# Patient Record
Sex: MALE | Race: OTHER | URBAN - METROPOLITAN AREA
[De-identification: names, ages, dates, MRNs, and addresses within clinical notes are randomized per-mention and may not be internally consistent; named-entity substitution may affect disease eponyms.]

---

## 2024-06-13 ENCOUNTER — HOSPITAL ENCOUNTER (OUTPATIENT)
Facility: HOSPITAL | Age: 69
Discharge: HOME | End: 2024-06-13
Attending: INTERNAL MEDICINE | Admitting: INTERNAL MEDICINE
Payer: MEDICARE

## 2024-06-13 VITALS
HEART RATE: 63 BPM | DIASTOLIC BLOOD PRESSURE: 76 MMHG | TEMPERATURE: 97.4 F | SYSTOLIC BLOOD PRESSURE: 138 MMHG | HEIGHT: 69 IN | RESPIRATION RATE: 14 BRPM | OXYGEN SATURATION: 96 % | WEIGHT: 170 LBS | BODY MASS INDEX: 25.18 KG/M2

## 2024-06-13 DIAGNOSIS — R07.9 CHEST PAIN, UNSPECIFIED TYPE: ICD-10-CM

## 2024-06-13 LAB
ECHO BSA: 1.94 M2
EKG ATRIAL RATE: 63 BPM
EKG DIAGNOSIS: NORMAL
EKG P AXIS: 80 DEGREES
EKG P-R INTERVAL: 166 MS
EKG Q-T INTERVAL: 432 MS
EKG QRS DURATION: 90 MS
EKG QTC CALCULATION (BAZETT): 442 MS
EKG R AXIS: 55 DEGREES
EKG T AXIS: 41 DEGREES

## 2024-06-13 PROCEDURE — 99153 MOD SED SAME PHYS/QHP EA: CPT | Performed by: INTERNAL MEDICINE

## 2024-06-13 PROCEDURE — 6360000002 HC RX W HCPCS: Performed by: INTERNAL MEDICINE

## 2024-06-13 PROCEDURE — 93458 L HRT ARTERY/VENTRICLE ANGIO: CPT | Performed by: INTERNAL MEDICINE

## 2024-06-13 PROCEDURE — 99152 MOD SED SAME PHYS/QHP 5/>YRS: CPT | Performed by: INTERNAL MEDICINE

## 2024-06-13 PROCEDURE — C1894 INTRO/SHEATH, NON-LASER: HCPCS | Performed by: INTERNAL MEDICINE

## 2024-06-13 PROCEDURE — 2580000003 HC RX 258: Performed by: INTERNAL MEDICINE

## 2024-06-13 PROCEDURE — 6360000004 HC RX CONTRAST MEDICATION: Performed by: INTERNAL MEDICINE

## 2024-06-13 PROCEDURE — C1713 ANCHOR/SCREW BN/BN,TIS/BN: HCPCS | Performed by: INTERNAL MEDICINE

## 2024-06-13 PROCEDURE — 93005 ELECTROCARDIOGRAM TRACING: CPT | Performed by: INTERNAL MEDICINE

## 2024-06-13 PROCEDURE — 2709999900 HC NON-CHARGEABLE SUPPLY: Performed by: INTERNAL MEDICINE

## 2024-06-13 PROCEDURE — 93571 IV DOP VEL&/PRESS C FLO 1ST: CPT | Performed by: INTERNAL MEDICINE

## 2024-06-13 PROCEDURE — 2500000003 HC RX 250 WO HCPCS: Performed by: INTERNAL MEDICINE

## 2024-06-13 PROCEDURE — C1769 GUIDE WIRE: HCPCS | Performed by: INTERNAL MEDICINE

## 2024-06-13 PROCEDURE — C1887 CATHETER, GUIDING: HCPCS | Performed by: INTERNAL MEDICINE

## 2024-06-13 RX ORDER — NITROGLYCERIN 0.3 MG/1
0.3 TABLET SUBLINGUAL EVERY 5 MIN PRN
COMMUNITY

## 2024-06-13 RX ORDER — HEPARIN SODIUM 200 [USP'U]/100ML
INJECTION, SOLUTION INTRAVENOUS CONTINUOUS PRN
Status: COMPLETED | OUTPATIENT
Start: 2024-06-13 | End: 2024-06-13

## 2024-06-13 RX ORDER — FENTANYL CITRATE 50 UG/ML
INJECTION, SOLUTION INTRAMUSCULAR; INTRAVENOUS PRN
Status: DISCONTINUED | OUTPATIENT
Start: 2024-06-13 | End: 2024-06-13 | Stop reason: HOSPADM

## 2024-06-13 RX ORDER — MIDAZOLAM HYDROCHLORIDE 1 MG/ML
INJECTION INTRAMUSCULAR; INTRAVENOUS PRN
Status: DISCONTINUED | OUTPATIENT
Start: 2024-06-13 | End: 2024-06-13 | Stop reason: HOSPADM

## 2024-06-13 RX ORDER — BIVALIRUDIN 250 MG/5ML
INJECTION, POWDER, LYOPHILIZED, FOR SOLUTION INTRAVENOUS PRN
Status: DISCONTINUED | OUTPATIENT
Start: 2024-06-13 | End: 2024-06-13 | Stop reason: HOSPADM

## 2024-06-13 RX ORDER — VERAPAMIL HYDROCHLORIDE 2.5 MG/ML
INJECTION, SOLUTION INTRAVENOUS PRN
Status: DISCONTINUED | OUTPATIENT
Start: 2024-06-13 | End: 2024-06-13 | Stop reason: HOSPADM

## 2024-06-13 RX ORDER — HEPARIN SODIUM 1000 [USP'U]/ML
INJECTION, SOLUTION INTRAVENOUS; SUBCUTANEOUS PRN
Status: DISCONTINUED | OUTPATIENT
Start: 2024-06-13 | End: 2024-06-13 | Stop reason: HOSPADM

## 2024-06-13 RX ORDER — LIDOCAINE HYDROCHLORIDE 10 MG/ML
INJECTION, SOLUTION INFILTRATION; PERINEURAL PRN
Status: DISCONTINUED | OUTPATIENT
Start: 2024-06-13 | End: 2024-06-13 | Stop reason: HOSPADM

## 2024-06-13 RX ORDER — SODIUM CHLORIDE 9 MG/ML
INJECTION, SOLUTION INTRAVENOUS CONTINUOUS PRN
Status: COMPLETED | OUTPATIENT
Start: 2024-06-13 | End: 2024-06-13

## 2024-06-13 NOTE — PROGRESS NOTES
1525  Pt arrived to recovery room post procedure.  EKG retrieved per MD request  Arm immobilizer placed on affected wrist.    1530  Pt eating and tolerating PO diet well.    TR BANDS:  1715  Began removal of air from TR BAND. No bleeding and no hematoma present at sites.    1745  Educated patient about their sedation precautions such as not driving, operating any machinery, or signing legal documents 24 hours post procedure.  Reviewed discharge instructions, including medications and site care using the teach back method. Answered all questions. Verbalized understanding. Pt had an opportunity to ask questions. Pt is also aware of how to handle a site if it starts bleeding or develops a hematoma.    1800  Removal of air from TR Band complete. No bleeding and no hematoma.    1815  TR Band removed. No bleeding and no hematoma present. Tegaderm and gauze dressing applied.     1820  Pt walked to the restroom and voided successfully. Site(s) have no bleeding and no hematoma present    1830  RN removed pt IV.  Pt getting dressed  RN called pt ride home.    1838  Arm immobilizer placed on affected wrist.  Pt discharged to ride at main entrance of hospital via wheel chair by RN.  Pt discharged withDischarge Paperwork, Extra Band Aids, Clothing, Dentures, Wrist Immobilizer, \"Who Cared For You\" Card, and Glasses

## 2024-06-13 NOTE — PROGRESS NOTES
Cardiac Cath Lab Recovery Arrival Note:      Kan Rangel arrived to Cardiac Cath Lab, Recovery Area. Staff introduced to patient. Patient identifiers verified with NAME and DATE OF BIRTH. Procedure verified with patient. Consent forms reviewed and signed by patient or authorized representative and verified. Allergies verified.     Patient and family oriented to department. Patient and family informed of procedure and plan of care.     Questions answered with review. Patient prepped for procedure, per orders from physician, prior to arrival.    Patient on cardiac monitor, non-invasive blood pressure, SPO2 monitor. On room air. Patient is A&Ox 4. Patient reports no pain.     Patient in stretcher, in low position, with side rails up, call bell within reach, patient instructed to call if assistance as needed.    Patient prep in: [facility] Recovery Area, [place] FAST TRACK 4.   Patient family has pager # [phone]  Family in: [phone].   Prep by: YESSI

## 2024-06-13 NOTE — PROGRESS NOTES
1525  [facility] Procedural to Recovery REPORT:    Verbal report received from GURWINDER Jacobo on Kan Rangel  being received from [facility] Procedural Area for routine progression of care. Report consisted of patient’s Situation, Background, Assessment and Recommendations(SBAR). Information from the following report(s) Procedure, Findings, Medications, and Site Assessment; was reviewed with the receiving clinician. Opportunity for questions and clarification was provided. Assessment completed upon patient’s arrival to Cardiac Cath Lab RECOVERY AREA and care assumed.       Cardiac Cath Lab Recovery Arrival Note:    Kan Rangel arrived to [facility] recovery area.  Patient procedure= LHC. Patient on cardiac monitor, non-invasive blood pressure, SPO2 monitor. On room air.  IV  of NS on pump at 100 ml/hr. Patient status doing well without problems. Patient is A&Ox 4. Patient reports no complaints.    PROCEDURE SITE CHECK:    Procedure site:without any bleeding and no hematoma, no pain/discomfort reported at procedure site.     No change in patient status. Continue to monitor patient and status.

## 2024-06-13 NOTE — DISCHARGE INSTRUCTIONS
any signs or symptoms of infection such as fever, chills, or poorly healing incision, persistent tenderness or swelling around the access site, redness and/or warmth to the touch, numbness, significant tingling or pain at the access site or affected extremity, rash, drainage from the access site, or if the affected arm or leg feels tight or swollen, call your physician right away.    30 minutes of activity a day is recommended. If it is too hot or too cold outside, perform activities in doors.     Limit bending at the site. Do not strain when baring down with bowel movements. These activities could cause the site to bleed.     If you have a groin site: limit how often you go up and down stairs. Take the stairs slowly.    If bleeding or hematoma occurs at the access site occurs, take a clean gauze pad and apply direct pressure to the cath site just above the puncture site.  Call 911 immediately, and continue to apply direct pressure until an ambulance gets to your location to take you to the ER. DO not drive yourself, do not have anyone else drive you.     CALL 911 for any circumstance that you feel is emergent.    If you take METFORMIN and received contrast dye today; do not take this medication for 48 hours.     You may return to work  2  days after your procedure if no access site bleeding.       Information obtained by :  I understand that if any problems occur once I am at home I am to contact my physician.    I understand and acknowledge receipt of the instructions indicated above.                                                                                                                                           R.N.'s Signature                                                                  Date/Time                                                                                                                                              Patient or Representative Signature

## 2024-06-13 NOTE — PROGRESS NOTES
Cardiac Cath Lab Procedure Area Arrival Note:    Kan Rangel arrived to Cardiac Cath Lab, Procedure Area. Patient identifiers verified with NAME and DATE OF BIRTH. Procedure verified with patient. Consent forms verified. Allergies verified. Patient informed of procedure and plan of care. Questions answered with review. Patient voiced understanding of procedure and plan of care.    Patient on cardiac monitor, non-invasive blood pressure, SPO2 monitor. On RA and placed on O2 @ 2 lpm via NC.  IV of NS on pump at 50 ml/hr. Patient status doing well without problems. Patient is A&Ox 4. Patient reports no CP or SOB.     Patient medicated during procedure with orders obtained and verified by Dr. ARIZMENDI.    Refer to patients Cardiac Cath Lab PROCEDURE REPORT for vital signs, assessment, status, and response during procedure, printed at end of case. Printed report on chart or scanned into chart.

## 2024-06-13 NOTE — PROGRESS NOTES
CATH LAB to RECOVERY ROOM REPORT    Procedure: [facility]    MD: HEAVENLY Ibrahim MD    The procedure was diagnostic only.    Verbal Report given to Recovery Nurse on patient being transferred to Cardiac Cath Lab  for routine post-op. Patient stable upon transfer to .  Vitals, mental status, MAR, procedural summary discussed with recovery RN.    Medication given during procedure:    Contrast:80mL                          Heparin:2000units     Versed:3mg                                                               Fentanyl:50mcg                                                             Angiomax running 2ml/hr.  Stopped at 1515    Other Meds/Drips given:      Sheaths:    Right radial sheath pulled at 1519 pm, band at 10mL of air

## (undated) DEVICE — SPLINT WR VELC FOAM NEUT POS DISP FOR RAD ART ACC SFT STRP

## (undated) DEVICE — CUSTOM KT PTCA INFL DEV K05 00052M

## (undated) DEVICE — Device: Brand: OMNIWIRE PRESSURE GUIDE WIRE

## (undated) DEVICE — GLIDESHEATH SLENDER STAINLESS STEEL KIT: Brand: GLIDESHEATH SLENDER

## (undated) DEVICE — ANGIOGRAPHIC CATHETER: Brand: IMPULSE™

## (undated) DEVICE — WASTE KIT - ST MARY: Brand: MEDLINE INDUSTRIES, INC.

## (undated) DEVICE — MEDTRONIC JL3.5 DIAGNOSTIC CATHETER

## (undated) DEVICE — SPECIAL PROCEDURE DRAPE 32" X 34": Brand: SPECIAL PROCEDURE DRAPE

## (undated) DEVICE — KIT AT-X65 ANGIOTOUCH HAND CONTROLLER

## (undated) DEVICE — PACK PROCEDURE SURG HRT CATH

## (undated) DEVICE — KIT MFLD ISOLATN NACL CNTRST PRT TBNG SPIK W/ PRSS TRNSDUC

## (undated) DEVICE — ANGIOGRAPHY KIT

## (undated) DEVICE — KIT HND CTRL 3 W STPCOCK ROT END 54IN PREM HI PRSS TBNG AT

## (undated) DEVICE — GUIDE CATHETER: Brand: RUNWAY™

## (undated) DEVICE — PADPRO DEFIBRILLATION/PACING/CARDIOVERSION/MONITORING ELECTRODES, ADULT/CHILD GREATER THAN 10 KG RADIOTRANSPARENT ELECTRODE, PHYSIO-CONTROL QUIK-COMBO (M) 60" (152 CM): Brand: PADPRO

## (undated) DEVICE — KIT MED IMAG CNTRST AGNT W/ IOPAMIDOL REUSE

## (undated) DEVICE — TR BAND RADIAL ARTERY COMPRESSION DEVICE: Brand: TR BAND